# Patient Record
Sex: FEMALE | Race: AMERICAN INDIAN OR ALASKA NATIVE
[De-identification: names, ages, dates, MRNs, and addresses within clinical notes are randomized per-mention and may not be internally consistent; named-entity substitution may affect disease eponyms.]

---

## 2022-09-13 ENCOUNTER — HOSPITAL ENCOUNTER (EMERGENCY)
Dept: HOSPITAL 5 - ED | Age: 42
LOS: 1 days | Discharge: HOME | End: 2022-09-14
Payer: COMMERCIAL

## 2022-09-13 DIAGNOSIS — M79.10: Primary | ICD-10-CM

## 2022-09-13 DIAGNOSIS — J45.909: ICD-10-CM

## 2022-09-13 DIAGNOSIS — R51.9: ICD-10-CM

## 2022-09-13 PROCEDURE — 99283 EMERGENCY DEPT VISIT LOW MDM: CPT

## 2022-09-13 PROCEDURE — 36415 COLL VENOUS BLD VENIPUNCTURE: CPT

## 2022-09-13 PROCEDURE — 96375 TX/PRO/DX INJ NEW DRUG ADDON: CPT

## 2022-09-13 PROCEDURE — 83690 ASSAY OF LIPASE: CPT

## 2022-09-13 PROCEDURE — 85027 COMPLETE CBC AUTOMATED: CPT

## 2022-09-13 PROCEDURE — 96361 HYDRATE IV INFUSION ADD-ON: CPT

## 2022-09-13 PROCEDURE — 84703 CHORIONIC GONADOTROPIN ASSAY: CPT

## 2022-09-13 PROCEDURE — 80053 COMPREHEN METABOLIC PANEL: CPT

## 2022-09-13 PROCEDURE — 85045 AUTOMATED RETICULOCYTE COUNT: CPT

## 2022-09-13 PROCEDURE — 96374 THER/PROPH/DIAG INJ IV PUSH: CPT

## 2022-09-13 PROCEDURE — 81001 URINALYSIS AUTO W/SCOPE: CPT

## 2022-09-14 VITALS — DIASTOLIC BLOOD PRESSURE: 78 MMHG | SYSTOLIC BLOOD PRESSURE: 124 MMHG

## 2022-09-14 LAB
ALBUMIN SERPL-MCNC: 4.7 G/DL (ref 3.9–5)
ALT SERPL-CCNC: 12 UNITS/L (ref 7–56)
BUN SERPL-MCNC: 10 MG/DL (ref 7–17)
BUN/CREAT SERPL: 13 %
CALCIUM SERPL-MCNC: 9.3 MG/DL (ref 8.4–10.2)
HCT VFR BLD CALC: 36.8 % (ref 30.3–42.9)
HEMOLYSIS INDEX: 10
HGB BLD-MCNC: 11.8 GM/DL (ref 10.1–14.3)
MCHC RBC AUTO-ENTMCNC: 32 % (ref 30–34)
MCV RBC AUTO: 70 FL (ref 79–97)
MUCOUS THREADS #/AREA URNS HPF: (no result) /HPF
PLATELET # BLD: 331 K/MM3 (ref 140–440)
RBC # BLD AUTO: 5.27 M/MM3 (ref 3.65–5.03)
RBC #/AREA URNS HPF: < 1 /HPF (ref 0–6)
WBC #/AREA URNS HPF: < 1 /HPF (ref 0–6)

## 2022-09-14 NOTE — EMERGENCY DEPARTMENT REPORT
ED Headache HPI





- General


Chief Complaint: Dizziness


Stated Complaint: LIGHT HEADED/DIZZINESS


Time Seen by Provider: 09/14/22 07:12





- History of Present Illness


Initial Comments: 





42-year-old black female with a past medical history of sickle cell disease, 

Amy-Parkinson-White status post ablation, and asthma presents to the emergency

department for evaluation of 3-day history of headache with intermittent 

dizziness and nausea and vomiting.  She states that symptoms are worse when she 

is lying down.  She states that she took a home COVID test but it was negative. 

She states that headache at its worst is 7 out of 10.  She denies vision changes

and photophobia.  Patient also states that she has been having generalized body 

aches and thinks that she may be going into a sickle cell crisis.


Timing/Duration: other (4 days)


Quality: moderate


Head Injury Location: frontal


Associated Symptoms: nausea/vomiting.  denies: fatigue, facial pain, 

fever/chills, flushing, loss of consciousness, nasal congestion, nasal drainage,

numbness in legs/feet, rash, seizures, sinus infection, stiff neck, vision 

changes, weakness


Allergies/Adverse Reactions: 


Allergies





aspirin Allergy (Verified 09/14/22 00:06)


   Unknown


butorphanol [From Stadol] Allergy (Verified 09/14/22 00:06)


   Unknown


ketorolac [From Toradol] Allergy (Verified 09/14/22 00:06)


   Unknown


latex Allergy (Verified 09/14/22 00:06)


   Unknown








Home Medications: 


Ambulatory Orders





Acetaminophen/Codeine [Tylenol /Codeine # 3 tab] 1 tab PO Q6H PRN #12 tab 

09/14/22 











ED Review of Systems


ROS: 


Stated complaint: LIGHT HEADED/DIZZINESS


Other details as noted in HPI





Comment: All other systems reviewed and negative


Constitutional: denies: chills, fever, malaise, weakness


Eyes: denies: eye pain, eye discharge, vision change


ENT: denies: ear pain, throat pain, dental pain, congestion


Respiratory: denies: cough, shortness of breath, SOB with exertion, SOB at rest,

stridor, wheezing


Cardiovascular: denies: chest pain, palpitations


Gastrointestinal: nausea, vomiting.  denies: abdominal pain, diarrhea, hemat

emesis, melena, hematochezia


Genitourinary: denies: urgency, dysuria


Musculoskeletal: myalgia.  denies: back pain


Skin: denies: rash, lesions


Neurological: headache.  denies: weakness, numbness, paresthesias, confusion, 

abnormal gait, vertigo





ED Past Medical Hx





- Past Medical History


Previous Medical History?: Yes


Hx Sickle Cell Disease: Yes


Hx Asthma: Yes


Additional medical history: WPW WITH ABLASION





- Surgical History


Past Surgical History?: No





- Social History


Smoking Status: Never Smoker


Substance Use Type: None





- Medications


Home Medications: 


                                Home Medications











 Medication  Instructions  Recorded  Confirmed  Last Taken  Type


 


Acetaminophen/Codeine [Tylenol 1 tab PO Q6H PRN #12 tab 09/14/22  Unknown Rx





/Codeine # 3 tab]     














ED Physical Exam





- General


Limitations: No Limitations


General appearance: alert, in no apparent distress





- Head


Head exam: Present: atraumatic, normocephalic





- Eye


Eye exam: Present: normal appearance.  Absent: conjunctival injection, 

periorbital swelling, periorbital tenderness





- ENT


ENT exam: Absent: normal exam (Bilateral nasal mucosal edema), normal orophraynx

(Erythema noted to posterior oropharynx)





- Neck


Neck exam: Present: normal inspection, full ROM.  Absent: tenderness, 

lymphadenopathy





- Respiratory


Respiratory exam: Present: normal lung sounds bilaterally.  Absent: respiratory 

distress, wheezes, rales, rhonchi, stridor, chest wall tenderness





- Cardiovascular


Cardiovascular Exam: Present: regular rate, normal heart sounds





- GI/Abdominal


GI/Abdominal exam: Present: soft, normal bowel sounds.  Absent: distended, 

tenderness, guarding, rebound, rigid





- Extremities Exam


Extremities exam: Present: normal inspection, full ROM, normal capillary refill.

 Absent: tenderness, pedal edema, joint swelling, calf tenderness





- Back Exam


Back exam: Present: normal inspection.  Absent: CVA tenderness (R), CVA tenderne

ss (L)





- Neurological Exam


Neurological exam: Present: alert, oriented X3, CN II-XII intact, normal gait





- Expanded Neurological Exam


  ** Expanded


Patient oriented to: Present: person, place, time


Speech: Present: fluid speech


Cranial nerves: EOM's Intact: Normal, Gag Reflex: Normal, Tongue Deviation: 

Normal


Ataxia: Absent: yes


Cerebellar function: [place]berg: Normal


Sensory exam: Upper Extremity Light Touch: Normal, Upper Extremity Temperature: 

Normal, Lower Extremity Light Touch: Normal, Lower Extremity Temperature: Normal


Motor strength exam: RUE: 5, LUE: 5, RLE: 5, LLE: 5


Best Eye Response ([place]): (4) open spontaneously


Best Motor Response ([place]): (6) obeys commands


Best Verbal Response ([place]): (5) oriented


Maria Luz Total: 15





- Psychiatric


Psychiatric exam: Present: normal affect, normal mood





- Skin


Skin exam: Present: warm, dry, intact, normal color





ED Course


                                   Vital Signs











  09/13/22 09/14/22 09/14/22





  23:33 03:17 09:57


 


Temperature 98.8 F 98.3 F 


 


Pulse Rate 79 57 L 


 


Respiratory 18 18 





Rate   


 


Blood Pressure 154/95 144/93 


 


Blood Pressure   





[Right]   


 


O2 Sat by Pulse 100 99 99





Oximetry   














  09/14/22





  11:25


 


Temperature 98.3 F


 


Pulse Rate 88


 


Respiratory 18





Rate 


 


Blood Pressure 


 


Blood Pressure 124/78





[Right] 


 


O2 Sat by Pulse 100





Oximetry 














- Reevaluation(s)


Reevaluation #1: 





09/14/22 10:55


Patient states that headache only minimally improved.  Nausea not improved, 

patient states that she refuses Reglan secondary to palpitations and the last 

time that she took it.





ED Medical Decision Making





- Lab Data


Result diagrams: 


                                 09/14/22 08:30





                                 09/14/22 08:30





- Medical Decision Making





42-year-old black female with a past medical history of sickle cell disease, 

Amy-Parkinson-White status post ablation, and asthma presents to the emergency

 department for evaluation of 3-day history of headache with intermittent 

dizziness and nausea and vomiting.  She states that symptoms are worse when she 

is lying down.  She states that she took a home COVID test but it was negative. 

 She states that headache at its worst is 7 out of 10.  She denies vision 

changes and photophobia.  Patient also states that she has been having 

generalized body aches and thinks that she may be going into a sickle cell 

crisis.





Physical exam unremarkable.  Work-up unremarkable.  Patient with some 

improvement in headache and body aches.  She will be discharged home to follow-

up with her primary care provider and advised to return to the emergency 

department for any worsening symptoms.  She verbalizes understanding of and 

agreement with plan of care.


Critical care attestation.: 


If time is entered above; I have spent that time in minutes in the direct care 

of this critically ill patient, excluding procedure time.








ED Disposition


Clinical Impression: 


 Myalgia





Headache


Qualifiers:


 Headache type: unspecified Headache chronicity pattern: acute headache 

Intractability: not intractable Qualified Code(s): R51.9 - Headache, unspecified





Disposition: 01 HOME / SELF CARE / HOMELESS


Is pt being admited?: No


Does the pt Need Aspirin: No


Condition: Stable


Instructions:  Musculoskeletal Pain, General Headache Without Cause, 

Easy-to-Read


Additional Instructions: 


Take medications as prescribed.  Increase intake of noncaffeinated fluids.  

Follow-up with your primary care provider if worsening symptoms or return to the

 emergency department as needed.


Prescriptions: 


Acetaminophen/Codeine [Tylenol /Codeine # 3 tab] 1 tab PO Q6H PRN #12 tab


 PRN Reason: Pain, Moderate (4-6)


Referrals: 


DENAE ECHOLS MD [Primary Care Provider] - 3-5 Days


Forms:  Work/School Release Form(ED)


Time of Disposition: 12:45